# Patient Record
Sex: FEMALE | Race: WHITE | URBAN - METROPOLITAN AREA
[De-identification: names, ages, dates, MRNs, and addresses within clinical notes are randomized per-mention and may not be internally consistent; named-entity substitution may affect disease eponyms.]

---

## 2020-07-27 ENCOUNTER — HOSPITAL ENCOUNTER (OUTPATIENT)
Dept: PHYSICAL THERAPY | Age: 80
Discharge: HOME | End: 2020-07-27
Payer: MEDICARE

## 2020-07-27 PROCEDURE — 97162 PT EVAL MOD COMPLEX 30 MIN: CPT

## 2020-07-27 PROCEDURE — 97110 THERAPEUTIC EXERCISES: CPT

## 2020-07-28 NOTE — PROGRESS NOTES
Danial Cruz  : 1940  [place]  Secondary: [facility] at [facility]  [address], [address]  [phone]   [phone]       OUTPATIENT PHYSICAL THERAPY: Daily Treatment Note 2020   ICD-10: Treatment Diagnosis:   M54.5 Low back pain     M54.32 Sciatica, left side     Pre-treatment Symptoms/Complaints:  The patient reported a history of lumbar pain followed by an onset of lumbar pain with L LE radiculopathy associated with what appear to be spinal stenosis. She has increased symptoms with weight bearing activities and reported surgery has been recommended by her orthopedic surgeon. She has been referred to physical therapy for treatment. Pain: Initial: Pain Intensity 1: 8  Post Session:  8/10   Medications Last Reviewed:  2020  Updated Objective Findings:  See evaluation note from today   TREATMENT:   Therapeutic Exercise: ( 25): The patient received treatment as below to improve mobility, strength and balance. Required moderate verbal and manual cues to promote proper body alignment, promote proper body posture and promote proper body mechanics. Progressed resistance, range and repetitions as indicated. The patient received exercise instruction followed by patient demonstration of the exercises to include trunk rotations, single knees to chest, hip flexor stretching in kneeling, and trunk flexibility exercises to stabilize the lumbar spine and improved AROM to decrease pain and spasms allowing improved function. [facility] Portal  Evaluation: ( X )  Manual Therapy (     ):   Therapeutic Modalities:   Observation/Orthostatic Postural Assessment:   Kyphotic  Palpation:  Tenderness with palpation to the patient's lumbar spine.   ROM: TRUNK AROM   Flexion        Min Limit   Extension   Sev Limit   Rotations    Min Limit                       Strength: LUMBAR SPINE   +3/5 Lumbar strength   +3/5 L LE Strength   +4/5 R LE Strength           Special Tests: N/A  Neurological Screen: N/A  Functional Mobility: Independent with a L limp. Balance:  Good. Treatment/Session Summary:    · Response to Treatment:  The patient was instructed in her initial home exercises which she is to perform in conjunction with PT.  · Communication/Consultation:  None today  · Equipment provided today:  None today  · Recommendations/Intent for next treatment session: Next visit will focus on therapeutic exercises to improve function. .  Treatment Plan of Care Effective Dates:  7/27/2020 to 9/27/2020  Total Treatment Billable Duration:  55 minutes  PT Patient Time In/Time Out  Time In: 1500  Time Out: 100 Lisa Jamison Bleugenie., PT    No future appointments.

## 2020-07-28 NOTE — PROGRESS NOTES
Ambulatory/Rehab Services H2 Model Falls Risk Assessment    Risk Factors:       No Risk Factors Identified Ability to Rise from Chair:       (0)  Ability to rise in a single movement    Falls Prevention Plan:       No modifications necessary   Total: (5 or greater = High Risk): 0    ©2010 [facility] [employer]. All Rights Reserved. [facility] Patent #[id number].  Federal Law prohibits the replication, distribution or use without written permission from [facility]

## 2020-07-28 NOTE — THERAPY EVALUATION
Franklin Red  : 1940  [place] / [place] / Product Type: Medicare /  [address]  at [facility]  [address], [address]  [phone]   [phone]      OUTPATIENT PHYSICAL THERAPY:Initial Assessment 2020    ICD-10: Treatment Diagnosis:   M54.5 Low back pain     M54.32 Sciatica, left side     Precautions/Allergies:   Codeine; Hydrocodone; Morphine; Sulfa (sulfonamide antibiotics); and Trazodone   Fall Risk Score: 0 (? 5 = High Risk)  MD Orders: Evaluate and treat. MEDICAL/REFERRING DIAGNOSIS:  Low back pain [M54.5]  Other chronic pain [G89.29]   DATE OF ONSET:   REFERRING PHYSICIAN: Rigoberto Martinez Ala, MD  RETURN PHYSICIAN APPOINTMENT: TBD     INITIAL ASSESSMENT:  Ms. Litzy Leong reported a history of lumbar pain followed by an onset of lumbar pain with L LE radiculopathy associated with what appear to be spinal stenosis. She has increased symptoms with weight bearing activities and reported surgery has been recommended by her orthopedic surgeon. She has been referred to physical therapy for treatment. PROBLEM LIST (Impacting functional limitations):  1. Decreased Strength  2. Decreased ADL/Functional Activities  3. Increased Pain  4. Decreased Activity Tolerance INTERVENTIONS PLANNED:  1. Home Exercise Program (HEP)  2. Neuromuscular Re-education/Strengthening  3. Range of Motion (ROM)  4. Therapeutic Exercise/Strengthening   TREATMENT PLAN:  Effective Dates: 2020 TO 2020. Frequency/Duration: 2 times a week for 8 weeks  GOALS: (Goals have been discussed and agreed upon with patient.)  Short-Term Functional Goals: Time Frame: 4 weeks. 1. Decrease Oswestry Index 4 points to allow improved ADLs with sitting and lifting. 2. Decrease AROM limits to moderate to decrease pain 1-2 levels. 3. Increase lumbar strength to decrease pain 1-2 levels.   Discharge Goals: Time Frame: 8 weeks.  1. Decrease Oswestry Index 8 points to allow progression to an independent home exercise program.  2. Decrease trunk AROM limits to minimal to decrease pain +2 levels. 3. Demonstrate independence in home exercises to allow DC from physical therapy. Rehabilitation Potential For Stated Goals: Good  Regarding Abbie Eason's therapy, I certify that the treatment plan above will be carried out by a therapist or under their direction. Thank you for this referral,  Lara Carvalho., PT     Referring Physician Signature: Taco Diaz MD              Date                    The information in this section was collected on 7/27/2020 (except where otherwise noted). HISTORY:   History of Present Injury/Illness (Reason for Referral): The patient reported a history of lumbar pain followed by an onset of lumbar pain with L LE radiculopathy associated with what appear to be spinal stenosis. She has increased symptoms with weight bearing activities and reported surgery has been recommended by her orthopedic surgeon. She has been referred to physical therapy for treatment. Past Medical History/Comorbidities:   Ms. Soto Duff  has a past medical history of Arthritis, Chronic pain, GERD (gastroesophageal reflux disease), High frequency hearing loss (9/2/2016), Hypercholesteraemia (dx 5 yrs ago), Hypertension (10 years), Nausea & vomiting, Psychiatric disorder, and SNHL (sensorineural hearing loss) (9/2/2016).  She also has no past medical history of Aneurysm ([address] Arrhythmia, Asthma, Autoimmune disease ([address] CAD (coronary artery disease), Cancer ([address] Chronic kidney disease, Coagulation defects, COPD, Diabetes ([address] Difficult intubation, Heart failure ([address] Liver disease, Malignant hyperthermia due to anesthesia, Other ill-defined conditions(799[address]89), Pseudocholinesterase deficiency, PUD (peptic ulcer disease), Seizures ([address] Stroke ([address] Thromboembolus ([address] Thyroid disease, Unspecified adverse effect of anesthesia, or Unspecified sleep apnea. Ms. Faina Acosta  has a past surgical history that includes hx appendectomy; hx dilation and curettage; neurological procedure unlisted (2008); and hx knee arthroscopy (2010). Social History/Living Environment:    Independent  Prior Level of Function/Work/Activity:  Retired  Dominant Side:         RIGHT  Current Medications:       Current Outpatient Medications:     tramadol (ULTRAM) 50 mg tablet, Take 50 mg by mouth every six (6) hours as needed. , Disp: , Rfl:     zolpidem CR (AMBIEN CR) 12.5 mg tablet, Take 12.5 mg by mouth nightly as needed. , Disp: , Rfl:     clonazepam (KLONOPIN) 0.5 mg tablet, Take 0.5 mg by mouth as needed. , Disp: , Rfl:     melatonin 3 mg tablet, Take 3 mg by mouth as needed. , Disp: , Rfl:     bisoprolol-hydrochlorothiazide (ZIAC) 5-6.25 mg per tablet, Take 2 Tabs by mouth every morning., Disp: , Rfl:     sertraline (ZOLOFT) 100 mg tablet, Take 200 mg by mouth daily. Indications: am, Disp: , Rfl:     simvastatin (ZOCOR) 40 mg tablet, Take 40 mg by mouth nightly., Disp: , Rfl:     lorazepam (ATIVAN) 1 mg tablet, Take 2 mg by mouth nightly as needed. , Disp: , Rfl:     ibuprofen (MOTRIN) 200 mg tablet, Take 800 mg by mouth two (2) times a day., Disp: , Rfl:     MULTIVITAMINS (MULTIVITAMIN PO), Take  by mouth. Indications: last dose 6/10/10, Disp: , Rfl:    Date Last Reviewed:  7/27/2020   Number of Personal Factors/Comorbidities that affect the Plan of Care: 3+: HIGH COMPLEXITY   EXAMINATION:   Observation/Orthostatic Postural Assessment:   Kyphotic  Palpation:  Tenderness with palpation to the patient's lumbar spine. ROM: TRUNK AROM   Flexion        Min Limit   Extension   Sev Limit   Rotations    Min Limit                       Strength: LUMBAR SPINE   +3/5 Lumbar strength   +3/5 L LE Strength   +4/5 R LE Strength           Special Tests: N/A  Neurological Screen: N/A  Functional Mobility: Independent with a L limp. Balance:  Good.    Body Structures Involved:  1. Nerves  2. Bones  3. Joints  4. Muscles Body Functions Affected:  1. Sensory/Pain  2. Neuromusculoskeletal  3. Movement Related Activities and Participation Affected:  1. General Tasks and Demands  2. Mobility  3. Community, Social and [place]   Number of elements (examined above) that affect the Plan of Care: 4+: HIGH COMPLEXITY   CLINICAL PRESENTATION:   Presentation: Evolving clinical presentation with changing clinical characteristics: MODERATE COMPLEXITY   CLINICAL DECISION MAKING:   Outcome Measure: Tool Used: Modified Oswestry Low Back Pain Questionnaire  Score:  Initial: 22/50  Most Recent: X/50 (Date: -- )   Interpretation of Score: Each section is scored on a 0-5 scale, 5 representing the greatest disability. The scores of each section are added together for a total score of 50. Medical Necessity:   · Patient demonstrates good rehab potential due to higher previous functional level. Reason for Services/Other Comments:  · Patient continues to require skilled intervention due to her limited ambulatory ability due to lumbar stenosis. .   Use of outcome tool(s) and clinical judgement create a POC that gives a: Questionable prediction of patient's progress: Stephen Rajan PT

## 2020-10-05 NOTE — THERAPY EVALUATION
Raquel Marshall  : 1940  [place] / Product Type: Medicare /  [address]  at [facility]  [address], [address]  [phone]   [phone]      OUTPATIENT PHYSICAL THERAPY:Discontinuation Summary 10/5/2020    ICD-10: Treatment Diagnosis:   M54.5 Low back pain     M54.32 Sciatica, left side     Precautions/Allergies:   Codeine; Hydrocodone; Morphine; Sulfa (sulfonamide antibiotics); and Trazodone   Fall Risk Score: 0 (? 5 = High Risk)  MD Orders: Evaluate and treat. MEDICAL/REFERRING DIAGNOSIS:  Low back pain [M54.5]  Other chronic pain [G89.29]   DATE OF ONSET:   REFERRING PHYSICIAN: Saqib Martinez MD  RETURN PHYSICIAN APPOINTMENT: TBD     INITIAL ASSESSMENT:  Ms. Betzaida Partida reported a history of lumbar pain followed by an onset of lumbar pain with L LE radiculopathy associated with what appear to be spinal stenosis. She has increased symptoms with weight bearing activities and reported surgery has been recommended by her orthopedic surgeon. She has been referred to physical therapy for treatment. PROBLEM LIST (Impacting functional limitations):  1. Decreased Strength  2. Decreased ADL/Functional Activities  3. Increased Pain  4. Decreased Activity Tolerance INTERVENTIONS PLANNED:  1. Home Exercise Program (HEP)  2. Neuromuscular Re-education/Strengthening  3. Range of Motion (ROM)  4. Therapeutic Exercise/Strengthening   TREATMENT PLAN:  Effective Dates: 2020 TO 2020. Frequency/Duration: 2 times a week for 8 weeks  GOALS: (Goals have been discussed and agreed upon with patient.)                                                VOLUNTARY DC  Short-Term Functional Goals: Time Frame: 4 weeks. 1. Decrease Oswestry Index 4 points to allow improved ADLs with sitting and lifting. 2. Decrease AROM limits to moderate to decrease pain 1-2 levels.    3. Increase lumbar strength to decrease pain 1-2 levels. Discharge Goals: Time Frame: 8 weeks. 1. Decrease Oswestry Index 8 points to allow progression to an independent home exercise program.  2. Decrease trunk AROM limits to minimal to decrease pain +2 levels. 3. Demonstrate independence in home exercises to allow DC from physical therapy. Rehabilitation Potential For Stated Goals: Good  Regarding Negrita Eason's therapy, I certify that the treatment plan above will be carried out by a therapist or under their direction. Thank you for this referral,  Ismaelbriana Santoyo, PT     Referring Physician Signature: Major Bower MD              Date                    The information in this section was collected on 7/27/2020 (except where otherwise noted). HISTORY:   History of Present Injury/Illness (Reason for Referral): The patient reported a history of lumbar pain followed by an onset of lumbar pain with L LE radiculopathy associated with what appear to be spinal stenosis. She has increased symptoms with weight bearing activities and reported surgery has been recommended by her orthopedic surgeon. She has been referred to physical therapy for treatment. Past Medical History/Comorbidities:   Ms. Edwin Zamora  has a past medical history of Arthritis, Chronic pain, GERD (gastroesophageal reflux disease), High frequency hearing loss (9/2/2016), Hypercholesteraemia (dx 5 yrs ago), Hypertension (10 years), Nausea & vomiting, Psychiatric disorder, and SNHL (sensorineural hearing loss) (9/2/2016).  She also has no past medical history of Aneurysm ([address] Arrhythmia, Asthma, Autoimmune disease ([address] CAD (coronary artery disease), Cancer ([address] Chronic kidney disease, Coagulation defects, COPD, Diabetes ([address] Difficult intubation, Heart failure ([address] Liver disease, Malignant hyperthermia due to anesthesia, Other ill-defined conditions(799.89), Pseudocholinesterase deficiency, PUD (peptic ulcer disease), Seizures ([address] Stroke ([address] Thromboembolus ([facility] Thyroid disease, Unspecified adverse effect of anesthesia, or Unspecified sleep apnea. Ms. Kye Kruse  has a past surgical history that includes hx appendectomy; hx dilation and curettage; neurological procedure unlisted (2008); and hx knee arthroscopy (2010). Social History/Living Environment:    Independent  Prior Level of Function/Work/Activity:  Retired  Dominant Side:         RIGHT  Current Medications:       Current Outpatient Medications:     tramadol (ULTRAM) 50 mg tablet, Take 50 mg by mouth every six (6) hours as needed. , Disp: , Rfl:     zolpidem CR (AMBIEN CR) 12.5 mg tablet, Take 12.5 mg by mouth nightly as needed. , Disp: , Rfl:     clonazepam (KLONOPIN) 0.5 mg tablet, Take 0.5 mg by mouth as needed. , Disp: , Rfl:     melatonin 3 mg tablet, Take 3 mg by mouth as needed. , Disp: , Rfl:     bisoprolol-hydrochlorothiazide (ZIAC) 5-6.25 mg per tablet, Take 2 Tabs by mouth every morning., Disp: , Rfl:     sertraline (ZOLOFT) 100 mg tablet, Take 200 mg by mouth daily. Indications: am, Disp: , Rfl:     simvastatin (ZOCOR) 40 mg tablet, Take 40 mg by mouth nightly., Disp: , Rfl:     lorazepam (ATIVAN) 1 mg tablet, Take 2 mg by mouth nightly as needed. , Disp: , Rfl:     ibuprofen (MOTRIN) 200 mg tablet, Take 800 mg by mouth two (2) times a day., Disp: , Rfl:     MULTIVITAMINS (MULTIVITAMIN PO), Take  by mouth. Indications: last dose 6/10/10, Disp: , Rfl:    Date Last Reviewed:  7/27/2020   Number of Personal Factors/Comorbidities that affect the Plan of Care: 3+: HIGH COMPLEXITY   EXAMINATION:   Observation/Orthostatic Postural Assessment:   Kyphotic  Palpation:  Tenderness with palpation to the patient's lumbar spine.   ROM: TRUNK AROM   Flexion        Min Limit   Extension   Sev Limit   Rotations    Min Limit                       Strength: LUMBAR SPINE   +3/5 Lumbar strength   +3/5 L LE Strength   +4/5 R LE Strength           Special Tests: N/A  Neurological Screen: N/A  Functional Mobility: Independent with a L limp. Balance:  Good. Body Structures Involved:  1. Nerves  2. Bones  3. Joints  4. Muscles Body Functions Affected:  1. Sensory/Pain  2. Neuromusculoskeletal  3. Movement Related Activities and Participation Affected:  1. General Tasks and Demands  2. Mobility  3. Community, Social and [place]   Number of elements (examined above) that affect the Plan of Care: 4+: HIGH COMPLEXITY   CLINICAL PRESENTATION:   Presentation: Evolving clinical presentation with changing clinical characteristics: MODERATE COMPLEXITY   CLINICAL DECISION MAKING:   Outcome Measure: Tool Used: Modified Oswestry Low Back Pain Questionnaire  Score:  Initial: 22/50  Most Recent: X/50 (Date: -- )   Interpretation of Score: Each section is scored on a 0-5 scale, 5 representing the greatest disability. The scores of each section are added together for a total score of 50. Medical Necessity:   · Patient demonstrates good rehab potential due to higher previous functional level. Reason for Services/Other Comments:  · Patient continues to require skilled intervention due to her limited ambulatory ability due to lumbar stenosis. .   Use of outcome tool(s) and clinical judgement create a POC that gives a: Questionable prediction of patient's progress: Stephen Anguiano PT

## 2022-02-21 ENCOUNTER — HOSPITAL ENCOUNTER (OUTPATIENT)
Dept: PHYSICAL THERAPY | Age: 82
Discharge: HOME | End: 2022-02-21
Payer: MEDICARE

## 2022-02-21 DIAGNOSIS — Z96.651 STATUS POST TOTAL RIGHT KNEE REPLACEMENT USING CEMENT: ICD-10-CM

## 2022-02-21 DIAGNOSIS — G89.29 CHRONIC RIGHT-SIDED LOW BACK PAIN, UNSPECIFIED WHETHER SCIATICA PRESENT: ICD-10-CM

## 2022-02-21 DIAGNOSIS — M54.50 CHRONIC RIGHT-SIDED LOW BACK PAIN, UNSPECIFIED WHETHER SCIATICA PRESENT: ICD-10-CM

## 2022-02-21 DIAGNOSIS — M70.71 BURSITIS OF RIGHT HIP, UNSPECIFIED BURSA: ICD-10-CM

## 2022-02-21 PROCEDURE — 97161 PT EVAL LOW COMPLEX 20 MIN: CPT

## 2022-02-21 PROCEDURE — 97140 MANUAL THERAPY 1/> REGIONS: CPT

## 2022-02-21 PROCEDURE — 97110 THERAPEUTIC EXERCISES: CPT

## 2022-02-21 NOTE — THERAPY EVALUATION
Kenneth Braxton  : 1940  [place]  Secondary: [facility] at [facility]  [address] [facility], [address]  [phone]   [phone]          OUTPATIENT PHYSICAL THERAPY:Initial Assessment 2022   ICD-10: Treatment Diagnosis: M54.5, M25.551, M25.561  Precautions/Allergies:   Codeine, Hydrocodone, Morphine, Sulfa (sulfonamide antibiotics), and Trazodone   TREATMENT PLAN:  Effective Dates: 2022 TO 2022 (90 days). Frequency/Duration: 2 times a week for 90 Day(s) MEDICAL/REFERRING DIAGNOSIS:  Status post total right knee replacement using cement [Z96.651]  Chronic right-sided low back pain, unspecified whether sciatica present [M54.50, G89.29]  Bursitis of right hip, unspecified bursa [M70.71]   DATE OF ONSET: 6 months  REFERRING PHYSICIAN: Balbina Sargent MD MD Orders: Maribell Saez and treat  Return MD Appointment:      INITIAL ASSESSMENT:  Ms. Luc Aleman presents to physical therapy with MD diagnosis of a low back pain and R knee pain. Pt demonstrated signs and symptoms consistent with this diagnosis. On objective examination, the patient demonstrated deficits in ROM, strength, joint mobility, soft tissue mobility, functional ability, functional mobility and ambulatory ability. The patient also had increased pain. The patient is limited in the following activities: walking, standing, bending, lifting, ADLs, functional tasks . The patient has a good  prognosis for recovery based on the examination findings and may be limited by: N/A. Patient requires skilled physical therapy services in order to return to prior level of function. PROBLEM LIST (Impacting functional limitations):  1. Decreased Strength  2. Decreased ADL/Functional Activities  3. Decreased Transfer Abilities  4. Decreased Ambulation Ability/Technique  5. Increased Pain  6. Decreased Activity Tolerance  7.  Decreased Flexibility/Joint Mobility  8. Decreased Knowledge of Precautions  9. Decreased [place] with Home Exercise Program INTERVENTIONS PLANNED: (Treatment may consist of any combination of the following)  1. Cold  2. Heat  3. Home Exercise Program (HEP)  4. Manual Therapy  5. Neuromuscular Re-education/Strengthening  6. Range of Motion (ROM)  7. Therapeutic Activites  8. Therapeutic Exercise/Strengthening     GOALS: (Goals have been discussed and agreed upon with patient.)  Short-Term Functional Goals: Time Frame: 4 weeks  1. Pt will be compliant with progressive HEP  2. Pt will perform 10min of aerobic exercise with pain < 2/10  3. Pt will walk for 5min with pain < 3/10  Discharge Goals: Time Frame: 10 weeks  1. Pt will walk for 15min with pain < 3/10  2. Pt will improve CECILIA score by 8pts  3. Pt will be able to stand and cook a meal with pain < 2/10    OUTCOME MEASURE:   Tool Used: Modified Oswestry Low Back Pain Questionnaire  Score:  Initial: 17/50  Most Recent: X/50 (Date: -- )   Interpretation of Score: Each section is scored on a 0-5 scale, 5 representing the greatest disability. The scores of each section are added together for a total score of 50. MEDICAL NECESSITY:   · Patient is expected to demonstrate progress in strength, range of motion and symptom levels to return to full function. REASON FOR SERVICES/OTHER COMMENTS:  · Patient requires skilled physical therapy in order to return to prior level of function      Rehabilitation Potential For Stated Goals: Good  Regarding Savannah Eason's therapy, I certify that the treatment plan above will be carried out by a therapist or under their direction.   Thank you for this referral,  Lidya Han PT, DPT, OCS  Referring Physician Signature: Angela Birch MD                 PAIN/SUBJECTIVE:   Initial:   3/10 Post Session:  2/10   HISTORY:   History of Injury/Illness (Reason for Referral):  Pt states that she has been having low back pain on and off for year and has started to have R knee pain over the last 6 months. Pain comes and goes and is described as a dull, aching pain that can be sharp at times. Thinks the back and knee pain are connected. Also reports pain in the groin and down the front of the thigh. Denies numbness/tingling, denies L LE pain. Pain comes with standing or walking > 10min. Eases include sitting and lying supine  Past Medical History/Comorbidities:   Ms. Darci Bird  has a past medical history of Arthritis, Chronic pain, GERD (gastroesophageal reflux disease), High frequency hearing loss (9/2/2016), Hypercholesteraemia (dx 5 yrs ago), Hypertension (10 years), Nausea & vomiting, Psychiatric disorder, and SNHL (sensorineural hearing loss) (9/2/2016). She has no past medical history of Aneurysm ([address] Arrhythmia, Asthma, Autoimmune disease ([address] CAD (coronary artery disease), Cancer ([address] Chronic kidney disease, Coagulation defects, COPD, Diabetes ([address] Difficult intubation, Heart failure ([address] Liver disease, Malignant hyperthermia due to anesthesia, Other ill-defined conditions(799.89), Pseudocholinesterase deficiency, PUD (peptic ulcer disease), Seizures ([address] Stroke ([address] Thromboembolus ([address] Thyroid disease, Unspecified adverse effect of anesthesia, or Unspecified sleep apnea. Ms. Darci Bird  has a past surgical history that includes hx appendectomy; hx dilation and curettage; neurological procedure unlisted (2008); and hx knee arthroscopy (2010). Social History/Living Environment:     Lives with family. No stairs in home  Prior Level of Function/Work/Activity:  Home tasks, walking  Personal Factors:          Sex:  female        Age:  80 y.o.    Ambulatory/Rehab Services H2 Model Falls Risk Assessment   Risk Factors:       No Risk Factors Identified Ability to Rise from Chair:       (1)  Pushes up, successful in one attempt   Falls Prevention Plan:       No modifications necessary   Total: (5 or greater = High Risk): 1   ©2010 [facility] States Patent #[id number]. Federal Law prohibits the replication, distribution or use without written permission from [facility] [place]   Current Medications:       Current Outpatient Medications:     tramadol (ULTRAM) 50 mg tablet, Take 50 mg by mouth every six (6) hours as needed. , Disp: , Rfl:     zolpidem CR (AMBIEN CR) 12.5 mg tablet, Take 12.5 mg by mouth nightly as needed. , Disp: , Rfl:     clonazepam (KLONOPIN) 0.5 mg tablet, Take 0.5 mg by mouth as needed. , Disp: , Rfl:     melatonin 3 mg tablet, Take 3 mg by mouth as needed. , Disp: , Rfl:     bisoprolol-hydrochlorothiazide (ZIAC) 5-6.25 mg per tablet, Take 2 Tabs by mouth every morning., Disp: , Rfl:     sertraline (ZOLOFT) 100 mg tablet, Take 200 mg by mouth daily. Indications: am, Disp: , Rfl:     simvastatin (ZOCOR) 40 mg tablet, Take 40 mg by mouth nightly., Disp: , Rfl:     lorazepam (ATIVAN) 1 mg tablet, Take 2 mg by mouth nightly as needed. , Disp: , Rfl:     ibuprofen (MOTRIN) 200 mg tablet, Take 800 mg by mouth two (2) times a day., Disp: , Rfl:     MULTIVITAMINS (MULTIVITAMIN PO), Take  by mouth.  Indications: last dose 6/10/10, Disp: , Rfl:    Date Last Reviewed:  02/21/22     Number of Personal Factors/Comorbidities that affect the Plan of Care: 1-2: MODERATE COMPLEXITY   EXAMINATION:   *= Painful     WNL= within normal limits     NT= not tested    Observation  Posture: R knee varus  Gait: limps on R, decreased hip rotation      ROM   Right Left   Flexion 100* 115   Extension 0* 5   ER 35* 45   IR 5* 20     Lumbar ROM is full, pain with end range extension    Strength (all MMT scores are graded on a scale of 0-5)   Right Left   Hip      Flexion 5* 5   Abduction 4-* 4-   Extension 4-* 4   Glute Max 4-* 4   ER 4* 5   IR 4* 5   Knee     Extension 5* 5   Flexion 5 5       Neuro Screen  Normal      Joint/Soft Tissue Mobility   Description   Joint Mobility  Hip: hypomobile, painful   Lumbar: no pain   Knee: normal mobility, some pain   Soft Tissue Mobility TTP around hip       Special Tests   Right Left   FADDIR + -   GUICHO + -   Scour's + -   Log Roll + -              Body Structures Involved:  1. Bones  2. Joints  3. Muscles  4. Ligaments Body Functions Affected:  1. Sensory/Pain  2. Neuromusculoskeletal  3. Movement Related Activities and Participation Affected:  1. General Tasks and Demands  2. Mobility  3. Self Care  4. Domestic Life  5. Interpersonal Interactions and Relationships  6.  Community, Social and [place]   Number of elements (examined above) that affect the Plan of Care: 3: MODERATE COMPLEXITY   CLINICAL PRESENTATION:   Presentation: Stable and uncomplicated: LOW COMPLEXITY   CLINICAL DECISION MAKING:   Use of outcome tool(s) and clinical judgement create a POC that gives a: Clear prediction of patient's progress: LOW COMPLEXITY     Elisabeth Holloway PT, DPT, OCS

## 2022-02-21 NOTE — PROGRESS NOTES
Morris Bernstein  : 1940  [place]  Secondary: [facility] at [facility]  [address], [facility] [address]  [phone]   [phone]         OUTPATIENT PHYSICAL THERAPY:Daily Note 2022      TREATMENT:   PT Patient Time In/Time Out  Time In: 1100  Time Out: 1145      Total Time: 45min  Visit Count:  1     ICD-10: Treatment Diagnosis: M54.5, M25.551, M25.561  Medication Last Reviewed: 22      TREATMENT PLAN  Effective Dates: 2022 TO 2022 (90 days). Frequency/Duration: 2 times a week for 90 Day(s)         Subjective: See Evaluation Note dated 2022 for details   Pain:     Objective: See Evaluation Note dated 2022 for details      Therapeutic Exercise: (15min) Done in order to improve strength, ROM and understanding of current condition.     Date:   Date:   Date:   Date:     Activity/Exercise Parameters      Education Discussed examination findings, HEP, plan of care      NuStep       Bridges 3x10      SLR 3x10 (small ROM)      Clams 3x10 green TB (hooklying)                 Manual Therapy: (15min) Done in order to improve joint and soft tissue mobility,reduce muscle guarding, and decrease muscle tone   Date:   Date:   Date:   Date:     Type Parameters      Joint Mobilization Hip: LAD, A-P grades I-IV      Soft Tissue Mobilization           Modalities: (-) Done in order to reduce swelling and pain    Assessment: See Evaluation Note dated 2022 for details    Plan: See Evaluation Note dated 2022 for details    Future Appointments   Date Time Provider Ricardo Anthony   2022  2:30 PM Jennie Block PT [facility]   3/3/2022  1:45 PM Jennie Block PT [facility]   3/9/2022 11:00 AM Jennie Block PT [facility]   3/11/2022 11:00 AM Jennie Block PT [facility]   3/14/2022 11:00 AM Jennie Block PT [facility]   3/18/2022 11:00 AM Jorge Krabbe, PT [facility]   3/21/2022 11:00 AM Jorge Krabbe, PT [facility]   3/25/2022 11:00 AM Jorge Krabbe, PT [facility]   3/29/2022 11:00 AM Jogre Krabbe, PT [facility]   4/1/2022 11:00 AM Jorge Darricke, PT [facility]       Elisa Pi Time: 15min eval  Units: 1 eval low/ 1MT/ 1TE      Rosado Bering, PT, DPT, OCS    Visit Approval Visit # Therapist initials Date A NS / Cx < 24 hr >24 hr Cx Comments    1 WJ 2/21 [x]  [] [] Initial evaluation       [] [] []        [] [] []        [] [] []        [] [] []        [] [] []        [] [] []        [] [] []        [] [] []        [] [] []        [] [] []        [] [] []        [] [] []        [] [] []        [] [] []        [] [] []        [] [] []        [] [] []

## 2022-02-24 ENCOUNTER — APPOINTMENT (OUTPATIENT)
Dept: PHYSICAL THERAPY | Age: 82
End: 2022-02-24
Payer: MEDICARE

## 2022-02-28 ENCOUNTER — APPOINTMENT (OUTPATIENT)
Dept: PHYSICAL THERAPY | Age: 82
End: 2022-02-28
Payer: MEDICARE

## 2022-03-03 ENCOUNTER — APPOINTMENT (OUTPATIENT)
Dept: PHYSICAL THERAPY | Age: 82
End: 2022-03-03

## 2022-03-04 ENCOUNTER — APPOINTMENT (OUTPATIENT)
Dept: PHYSICAL THERAPY | Age: 82
End: 2022-03-04

## 2022-03-14 ENCOUNTER — APPOINTMENT (OUTPATIENT)
Dept: PHYSICAL THERAPY | Age: 82
End: 2022-03-14

## 2022-03-18 ENCOUNTER — APPOINTMENT (OUTPATIENT)
Dept: PHYSICAL THERAPY | Age: 82
End: 2022-03-18

## 2022-03-21 ENCOUNTER — APPOINTMENT (OUTPATIENT)
Dept: PHYSICAL THERAPY | Age: 82
End: 2022-03-21

## 2022-03-25 ENCOUNTER — APPOINTMENT (OUTPATIENT)
Dept: PHYSICAL THERAPY | Age: 82
End: 2022-03-25

## 2022-03-29 ENCOUNTER — APPOINTMENT (OUTPATIENT)
Dept: PHYSICAL THERAPY | Age: 82
End: 2022-03-29

## 2022-04-01 ENCOUNTER — APPOINTMENT (OUTPATIENT)
Dept: PHYSICAL THERAPY | Age: 82
End: 2022-04-01